# Patient Record
Sex: FEMALE | Race: WHITE | URBAN - METROPOLITAN AREA
[De-identification: names, ages, dates, MRNs, and addresses within clinical notes are randomized per-mention and may not be internally consistent; named-entity substitution may affect disease eponyms.]

---

## 2019-02-06 ENCOUNTER — OFFICE VISIT (OUTPATIENT)
Dept: FAMILY MEDICINE CLINIC | Age: 24
End: 2019-02-06
Payer: COMMERCIAL

## 2019-02-06 VITALS
HEIGHT: 65 IN | BODY MASS INDEX: 20.79 KG/M2 | TEMPERATURE: 99.1 F | OXYGEN SATURATION: 98 % | RESPIRATION RATE: 16 BRPM | HEART RATE: 72 BPM | SYSTOLIC BLOOD PRESSURE: 107 MMHG | WEIGHT: 124.8 LBS | DIASTOLIC BLOOD PRESSURE: 66 MMHG

## 2019-02-06 DIAGNOSIS — Z00.00 ANNUAL PHYSICAL EXAM: Primary | ICD-10-CM

## 2019-02-06 PROCEDURE — 99385 PREV VISIT NEW AGE 18-39: CPT | Performed by: FAMILY MEDICINE

## 2019-02-06 ASSESSMENT — PATIENT HEALTH QUESTIONNAIRE - PHQ9
SUM OF ALL RESPONSES TO PHQ QUESTIONS 1-9: 0
SUM OF ALL RESPONSES TO PHQ QUESTIONS 1-9: 0
SUM OF ALL RESPONSES TO PHQ9 QUESTIONS 1 & 2: 0
2. FEELING DOWN, DEPRESSED OR HOPELESS: 0
1. LITTLE INTEREST OR PLEASURE IN DOING THINGS: 0

## 2021-03-02 ENCOUNTER — HOSPITAL ENCOUNTER (OUTPATIENT)
Dept: PHYSICAL THERAPY | Age: 26
Discharge: HOME | End: 2021-03-02
Payer: COMMERCIAL

## 2021-03-02 PROCEDURE — 97140 MANUAL THERAPY 1/> REGIONS: CPT

## 2021-03-02 PROCEDURE — 97110 THERAPEUTIC EXERCISES: CPT

## 2021-03-02 PROCEDURE — 97161 PT EVAL LOW COMPLEX 20 MIN: CPT

## 2021-03-02 NOTE — PROGRESS NOTES
PELVIC FLOOR DAILY TREATMENT NOTE  1-    Patient Name: Camden Lo  Date:3/2/2021  : 1995  [x]  Patient  Verified  Payor: Kirsten Hyman / Plan: [id number] [place] / Product Type: Managed Care Medicaid /    In time:8:00  Out time:9:00  Total Treatment Time (min): 60    (for MC and BCBS only)  Total Timed Codes (min): na  1:1 Treatment Time (min): na     Visit #: 1 of 8    Treatment Area: Encounter for routine postpartum follow-up [Z39.2]    SUBJECTIVE  Pain Level (0-10 scale): 0  Any medication changes, allergies to medications, adverse drug reactions, diagnosis change, or new procedure performed?: [x] No    [] Yes (see summary sheet for update)  Subjective functional status/changes:   [] No changes reported  See POC     OBJECTIVE  Modality rationale: Increase trissue extensibility, decrease pain in order to Improve ability to perform ADLs and to perform cardio exercise such as running.    Min Type Additional Details   na [x] Biofeedback x na minutes    na surface    [] Estim: []Att   []Unatt        []TENS instruct                  []IFC  []Premod   []NMES                     []Other:  []w/US   []w/ice   []w/heat  Position:  Location:    []  Traction: [] Cervical       []Lumbar                       [] Prone          []Supine                       []Intermittent   []Continuous Lbs:  [] before manual  [] after manual    []  Ultrasound: []Continuous   [] Pulsed                           []1MHz   []3MHz Location:  W/cm2:    []  Iontophoresis with dexamethasone         Location: [] Take home patch   [] In clinic   10 [x]  Ice     []  heat  []  Ice massage Position: supine  Location: left inner thigh    []  Vasopneumatic Device Pressure:       [] lo [] med [] hi   Temperature: [] lo [] med [] hi   [x] Skin assessment post-treatment:  [x]intact []redness- no adverse reaction       []redness - adverse reaction:     na min Therapeutic Exercise:  [x] See flow sheet :  []  Pelvic floor strengthening []  Pelvic floor downtraining  []  Quality pelvic floor contractions      []  Relaxation techniques  []  Urge suppression exercises  [x]  Other: Core strengthening   Rationale: Increase core strength in order to Improve ability to perform ADLs and improve patient's ability to exercise safely with diastasis recti. .     min Therapeutic Activity:  []  See flow sheet :   Rationale: na in order to na. 8 min Manual Therapy: MET right anterior ileal rotation and in flare. Rationale: Improve lumbosacral and coccygeal mobility in order to Improve ability to perform ADLs. The manual therapy interventions were performed at a separate and distinct time from the therapeutic activities interventions. 15 min Patient Education: [x] Review HEP/POC/Goals    [] Educated Pt in pelvic floor and SI anatomy, function/dysfunction. Performing bed mobility safely with diastasis recti. Discussed that patient may benefit from biofeedback to the pelvic floor. Patient to consider. [] positioning   [] body mechanics   [] transfers   [] heat/ice application        Other Objective/Functional Measures:    [x]baseline resting tone: na   [x]slow twitch mms na in na. [x]fast twitch mms na . Pain Level (0-10 scale) post treatment: 0    ASSESSMENT/Changes in Function: Justification for Eval Code Complexity:  Patient History : See POC  Examination see exam   Clinical Presentation: evolving  Clinical Decision Making : FOTO : 8 /100        Patient will continue to benefit from skilled PT services to modify and progress therapeutic interventions, address functional mobility deficits, address ROM deficits, address strength deficits, analyze and address soft tissue restrictions, analyze and cue movement patterns, analyze and modify body mechanics/ergonomics, assess and modify postural abnormalities and instruct in home and community integration to attain remaining goals.      [x]  See Plan of Care  []  See progress note/recertification  []  See Discharge Summary         Progress towards goals / Updated goals:  Initial evaluation completed with home exercise program and education initiated.       PLAN  [x]  Upgrade activities as tolerated     []  Continue plan of care  []  Update interventions per flow sheet       []  Discharge due to:_  []  Other:_      Ildefonso Hebert, PT 3/2/2021  10: 00 AM      Future Appointments   Date Time Provider Mick Lawson   3/12/2021 10:45 AM Emigdio Hernandez, PT [facility]

## 2021-03-02 NOTE — PROGRESS NOTES
[address] [facility] PHYSICAL THERAPY  [x]  At [facility] [address] [facility]. [address] [facility], [address] [facility] [address] - Phone: [phone] Fax: [phone] / [address] [facility]  Patient Name: Kenyetta [place] : 1995   Medical   Diagnosis: Encounter for routine postpartum follow-up [Z39.2] Treatment Diagnosis: Encounter for routine postpartum follow-up [Z39.2]   Onset Date: 2020     Referral Source: Jose L Tomlin, Shan [facility]): 3/2/2021   Prior Hospitalization: See medical history Provider #: [id number]   Prior Level of Function: Chronic upper and lower back pain since age 16 with insidious onset. Comorbidities: G1, P1   Medications: Verified on Patient Summary List   The Plan of Care and following information is based on the information from the initial evaluation.   ==================================================================================  Assessment / key information:  Patient is a 26 y.o. yo female who presents to [facility] with diagnosis of Encounter for routine postpartum follow-up [Z39.2]. Patient reports left inner thigh pain with running and any cardio exercises which began 2020 when she was 6 months pregnant. .  Patient is G1, P1 with vaginal delivery. Patient denies urinary symptoms or dyspareunia. BMs are every day to every other day  without straining. She has chronic LBP since age 16 for which she is currently receiving weekly chiropractic care. Patient reports that she has never had a back xray. She also has a 1 finger width diastasis recti. Patient is presently breastfeeding. Upon objective evaluation, patient demonstrates right SI hypomobility with anterior ileal rotation and in flare, postural deviation of increased lumbar lordosis, impaired trunk AROM in extension, decreased hamstring flexibility bilaterally and impaired hip strength bilaterally in adduction. There is a grade 1 diastasis recti present. Patient scored 8 on FOTO/PFDI pain indicating decreased quality of life. Patient can benefit from PT for manual therapy, therapeutic exercises and modalities to decrease muscular tenderness and pain, Increase core strength and LE flexibility to improve quality of life.   ==================================================================================  Eval Complexity: History: MEDIUM  Complexity : 1-2 comorbidities / personal factors will impact the outcome/ POC Exam:HIGH Complexity : 4+ Standardized tests and measures addressing body structure, function, activity limitation and / or participation in recreation  Presentation: MEDIUM Complexity : Evolving with changing characteristics  Clinical Decision Making:LOW Complexity : FOTO score of 75-100Overall Complexity:LOW   Problem List: Decreased strength, ROM and flexibility, Pain with running, Diastasis recti   Treatment Plan may include any combination of the following: Therapeutic exercise, Neuromuscular re-education, Manual therapy, Physical agent/modality, Patient education and Other, Biofeedback for the pelvic floor. Patient / Family readiness to learn indicated by: asking questions, trying to perform skills and interest  Persons(s) to be included in education: patient (P)  Barriers to Learning/Limitations: None  Measures taken:    Patient Goal (s): \"Exercises to heal diastasis recti, help managing leg pain\"   Patient self reported health status: excellent  Rehabilitation Potential: good  Short Term Goals: To be accomplished in 4 weeks:     1) Patient performing daily HEP. 2) Patient will report 25% improvement in thigh pain with running. 3) Patient educated in exercising and performing ADLs safely with diastasis recti. 4) Increase bilateral hip adduction to 4/5 to increase ability to perform cardio exercise. Long Term Goals:  To be accomplished in 8 weeks:   1) Patient independent in HEP and able to demonstrate correct technique for floor to chest lifting. 2) Patient will report 50% improvement in pain with running. 3) Decrease score on FOTO/PFDI pain to 3 to indicate improved quality of life. 4)Increase bilateral hip adduction to 5/5 to increase ability to perform cardio exercise. Frequency / Duration:   Patient to be seen  1  times per week for 8  weeks:  Patient / Caregiver education and instruction: Pain Management, Exercises and Other Body mechanics  G-Codes (GP): yanique  Therapist Signature: Anuel Medina PT Date: 5/7/4641   Certification Period: na Time: 8:00 AM   ==================================================================================  I certify that the above Physical Therapy Services are being furnished while the patient is under my care. I agree with the treatment plan and certify that this therapy is necessary. Physician Signature:        Date:       Time:     Please sign and return to [facility], [facility]. or you may fax the signed copy to [phone]. Thank you.      JONAH De La Garza*

## 2021-03-12 ENCOUNTER — HOSPITAL ENCOUNTER (OUTPATIENT)
Dept: PHYSICAL THERAPY | Age: 26
Discharge: HOME | End: 2021-03-12
Payer: COMMERCIAL

## 2021-03-12 PROCEDURE — 97110 THERAPEUTIC EXERCISES: CPT

## 2021-03-12 PROCEDURE — 97530 THERAPEUTIC ACTIVITIES: CPT

## 2021-03-12 NOTE — PROGRESS NOTES
PELVIC FLOOR DAILY TREATMENT NOTE  1-21    Patient Name: Marla Masterson  Date:3/12/2021  : 1995  [x]  Patient  Verified  Payor: Zaira Rain / Plan: [employer]  [employer] PT / Product Type: Commerical /    In time:10:46  Out time:11:37  Total Treatment Time (min): 51    (for MC and BCBS only)  Total Timed Codes (min): na  1:1 Treatment Time (min): na     Visit #: 2 of 8    Treatment Area: Encounter for routine postpartum follow-up [Z39.2]    SUBJECTIVE  Pain Level (0-10 scale): 0  Any medication changes, allergies to medications, adverse drug reactions, diagnosis change, or new procedure performed?: [x] No    [] Yes (see summary sheet for update)  Subjective functional status/changes:   [] No changes reported  Patient reports doing HEP 2x week. She is working out 4x week, climbing and HIT. Hasn't needed the chiropractor in 2 weeks. Has not had back pain in the last week. OBJECTIVE  Modality rationale: Decrease pain, prevent post exercise soreness in order to Improve ability to perform ADLs.    Min Type Additional Details   na [x] Biofeedback x na minutes    na surface    [] Estim: []Att   []Unatt        []TENS instruct                  []IFC  []Premod   []NMES                     []Other:  []w/US   []w/ice   []w/heat  Position:  Location:    []  Traction: [] Cervical       []Lumbar                       [] Prone          []Supine                       []Intermittent   []Continuous Lbs:  [] before manual  [] after manual    []  Ultrasound: []Continuous   [] Pulsed                           []1MHz   []3MHz Location:  W/cm2:    []  Iontophoresis with dexamethasone         Location: [] Take home patch   [] In clinic    []  Ice     []  heat  []  Ice massage Position:  Location:    []  Vasopneumatic Device Pressure:       [] lo [] med [] hi   Temperature: [] lo [] med [] hi   [x] Skin assessment post-treatment:  [x]intact []redness- no adverse reaction       []redness - adverse reaction:     40 min Therapeutic Exercise:  [x] See flow sheet :  [x]  Pelvic floor strengthening                []  Pelvic floor downtraining  []  Quality pelvic floor contractions      []  Relaxation techniques  []  Urge suppression exercises  [x]  Other: Core strengthening, stretching,exercises for diastasis recti   Rationale: Increase core strength in order to Improve ability to perform ADLs and improve ability to run and perform cardio exercise. s.    11 min Therapeutic Activity:  [x]  See flow sheet : Sitting posture in neutral spine, Body mechanics for floor to chest lifting and lifting baby   Rationale: Decrease risk of back injury with ADLs and improve safety of performing ADLs and exercises with diastasis recti. na min Manual Therapy:    Rationale: Improve lumbosacral and coccygeal mobility in order to Improve ability to perform ADLs. The manual therapy interventions were performed at a separate and distinct time from the therapeutic activities interventions. min Patient Education: [x] Review HEP    [] Progressed/Changed HEP based on: Add BKFO and PPT. Issued handouts for posture and body mechanics with ADLs  [] positioning   [] body mechanics   [] transfers   [] heat/ice application        Other Objective/Functional Measures:    [x]baseline resting tone: na   [x]slow twitch mms na in na. [x]fast twitch mms na . Pain Level (0-10 scale) post treatment: 0    ASSESSMENT/Changes in Function: Fair compliance with HEP. Advanced core strengthening, diastasis recti exercises and stretching with introduction to education on posture and body mechanics.     Patient will continue to benefit from skilled PT services to modify and progress therapeutic interventions, address functional mobility deficits, address ROM deficits, address strength deficits, analyze and address soft tissue restrictions, analyze and cue movement patterns, analyze and modify body mechanics/ergonomics, assess and modify postural abnormalities and instruct in home and community integration to attain remaining goals. []  See Plan of Care  []  See progress note/recertification  []  See Discharge Summary         Progress towards goals / Updated goals:                 1) Patient performing daily HEP. Progressing                 2) Patient will report 25% improvement in thigh pain with running. 3) Patient educated in exercising and performing ADLs safely with diastasis recti. 4) Increase bilateral hip adduction to 4/5 to increase ability to perform cardio exercise.     PLAN  [x]  Upgrade activities as tolerated     []  Continue plan of care  []  Update interventions per flow sheet       []  Discharge due to:_  []  Other:_      Julio Crockett, PT 3/12/2021  11:37 AM      Future Appointments   Date Time Provider Mick Lawson   4/1/2021 10:00 AM Linette Light, PT [facility]   4/21/2021 10:30 AM Linette Light, PT [facility]   4/28/2021 10:30 AM Linette Light, PT [facility]   5/4/2021 10:30 AM Linette Light, PT [facility]   5/11/2021 10:30 AM Linette Light, PT [facility]   5/18/2021 10:30 AM Linette Light, PT [facility]

## 2021-04-01 ENCOUNTER — APPOINTMENT (OUTPATIENT)
Dept: PHYSICAL THERAPY | Age: 26
End: 2021-04-01
Payer: COMMERCIAL

## 2021-04-16 ENCOUNTER — HOSPITAL ENCOUNTER (OUTPATIENT)
Dept: PHYSICAL THERAPY | Age: 26
Discharge: HOME | End: 2021-04-16
Payer: COMMERCIAL

## 2021-04-16 PROCEDURE — 97110 THERAPEUTIC EXERCISES: CPT

## 2021-04-16 NOTE — PROGRESS NOTES
PELVIC FLOOR DAILY TREATMENT NOTE  1-    Patient Name: Gage Thomason  Date:2021  : 1995  [x]  Patient  Verified  Payor: Chapin Oates / Plan: [address] [place] [address] Rd PT / Product Type: Commerical /    In time 2:27  Out time: 3:04  Total Treatment Time (min): 37    (for MC and BCBS only)  Total Timed Codes (min): 37  1:1 Treatment Time (min): 37     Visit #: 3 of 8    Treatment Area: Encounter for routine postpartum follow-up [Z39.2]    SUBJECTIVE  Pain Level (0-10 scale): 0  Any medication changes, allergies to medications, adverse drug reactions, diagnosis change, or new procedure performed?: [x] No    [] Yes (see summary sheet for update)  Subjective functional status/changes:   [] No changes reported  Patient reports that she has not been exercising consistently due to having family visiting. Left inner thigh pain 45% improved     OBJECTIVE  Modality rationale: Decrease pain, prevent post exercise soreness in order to Improve ability to perform ADLs.    Min Type Additional Details   na [x] Biofeedback x na minutes    na surface    [] Estim: []Att   []Unatt        []TENS instruct                  []IFC  []Premod   []NMES                     []Other:  []w/US   []w/ice   []w/heat  Position:  Location:    []  Traction: [] Cervical       []Lumbar                       [] Prone          []Supine                       []Intermittent   []Continuous Lbs:  [] before manual  [] after manual    []  Ultrasound: []Continuous   [] Pulsed                           []1MHz   []3MHz Location:  W/cm2:    []  Iontophoresis with dexamethasone         Location: [] Take home patch   [] In clinic    []  Ice     []  heat  []  Ice massage Position:  Location:    []  Vasopneumatic Device Pressure:       [] lo [] med [] hi   Temperature: [] lo [] med [] hi   [x] Skin assessment post-treatment:  [x]intact []redness- no adverse reaction       []redness  adverse reaction:     27 min Therapeutic Exercise:  [x] See flow sheet :  [x] Pelvic floor strengthening                []  Pelvic floor downtraining  []  Quality pelvic floor contractions      []  Relaxation techniques  []  Urge suppression exercises  [x]  Other: Core strengthening, stretching,exercises for diastasis recti   Rationale: Increase core strength in order to Improve ability to perform ADLs and improve ability to run and perform cardio exercise. s.    na min Therapeutic Activity:  [x]  See flow sheet :    Rationale: Decrease risk of back injury with ADLs and improve safety of performing ADLs and exercises with diastasis recti. na min Manual Therapy:    Rationale: Improve lumbosacral and coccygeal mobility in order to Improve ability to perform ADLs. The manual therapy interventions were performed at a separate and distinct time from the therapeutic activities interventions. 10 min Patient Education: [x] Review HEP    [] Progressed/Changed HEP based on: Discussed progress including functional improvements and impairments. Add single leg slides with TA. Advance to slow twitch 5 second holds for PF. Body mechanics for supine to sit with TA.  [] positioning   [] body mechanics   [] transfers   [] heat/ice application        Other Objective/Functional Measures:    [x]baseline resting tone: na   [x]slow twitch mms na in na. [x]fast twitch mms na . Pain Level (0-10 scale) post treatment: 0    ASSESSMENT/Changes in Function: See PN  Patient will continue to benefit from skilled PT services to modify and progress therapeutic interventions, address functional mobility deficits, address ROM deficits, address strength deficits, analyze and address soft tissue restrictions, analyze and cue movement patterns, analyze and modify body mechanics/ergonomics, assess and modify postural abnormalities and instruct in home and community integration to attain remaining goals.      []  See Plan of Care  [x]  See progress note/recertification  []  See Discharge Summary         Progress towards goals / Updated goals:                 See PN  PLAN  [x]  Upgrade activities as tolerated     []  Continue plan of care  []  Update interventions per flow sheet       []  Discharge due to:_  []  Other:_      Mari Vazquez, PT 4/16/2021  3:04 PM      Future Appointments   Date Time Provider Mick Lawson   4/21/2021 10:30 AM Carolamary Hidalgo, PT [facility]   4/28/2021 10:30 AM Carolamary Hidalgo, PT [facility]   5/4/2021 10:30 AM Carola Rocky, PT [facility]   5/11/2021 10:30 AM Carola Rocky, PT [facility]   5/18/2021 10:30 AM Carolamary Hidalgo, PT [facility]

## 2021-04-16 NOTE — PROGRESS NOTES
[address] PHYSICAL THERAPY  [x]  At [facility] [address] [place]. [address] [facility], [address] [facility] [address] - Phone: [phone] Fax: [phone]    PROGRESS NOTE  Patient Name: Fide Enamorado : 1995   Treatment/Medical Diagnosis: Encounter for routine postpartum follow-up [Z39.2]   Referral Source: Harrison Martin*     Date of Initial Visit: 3/2/2021 Attended Visits: 3 Missed Visits: 1   SUMMARY OF TREATMENT  PT has consisted of pelvic floor/core strengthening  education on posture, exercising safely with diastasis recti and body mechanics, stretching and home exercise program.   CURRENT STATUS  Patient has made good progress in PT with short term goals partially met. Functional progress Includes patient reporting 45% improvement in thigh pain with ADLs. She has not resumed running. Goal/Measure of Progress Goal Met? 1. Patient performing daily HEP. Status at last Eval: na Current Status: Pt inconsistent progressing   2. Patient will report 25% improvement in thigh pain with running. Status at last Eval: na Current Status: 45% progressing   3. Patient educated in exercising and performing ADLs safely with diastasis recti. 4) Increase bilateral hip adduction to 4/5 to increase ability to perform cardio exercise   Status at last Eval: Na  3-3+/5 Current Status: Patient educated and performing   5/5 Yes  yes   New Goals to be achieved in __4__  weeks:                   1) Patient independent in HEP and able to demonstrate correct technique for floor to chest lifting. 2) Patient will report 50% improvement in pain with running. 3) Decrease score on FOTO/PFDI pain to 3 to indicate improved quality of life. RECOMMENDATIONS  Continue pelvic floor PT 1x week for 4 weeks. If you have any questions/comments please contact us directly at [phone].    Thank you for allowing us to assist in the care of your patient. Therapist Signature: Miracle Esparza, HARDY Date: 4/16/2021     Time: 2:41 PM   NOTE TO PHYSICIAN:  PLEASE COMPLETE THE ORDERS BELOW AND FAX TO   [facility] at [facility], [facility].: [phone]. If you are unable to process this request in 24 hours please contact our office: [phone]    ___ I have read the above report and request that my patient continue as recommended.   ___ I have read the above report and request that my patient continue therapy with the following changes/special instructions:_________________________________________________________   ___ I have read the above report and request that my patient be discharged from therapy.      Physician Signature:        Date:       Time:      ALMA Ruiz

## 2021-04-21 ENCOUNTER — HOSPITAL ENCOUNTER (OUTPATIENT)
Dept: PHYSICAL THERAPY | Age: 26
Discharge: HOME | End: 2021-04-21
Payer: COMMERCIAL

## 2021-04-21 PROCEDURE — 97110 THERAPEUTIC EXERCISES: CPT

## 2021-04-21 PROCEDURE — 97140 MANUAL THERAPY 1/> REGIONS: CPT

## 2021-04-21 NOTE — PROGRESS NOTES
PELVIC FLOOR DAILY TREATMENT NOTE      Patient Name: Lenin Faustin  Date:2021  : 1995  [x]  Patient  Verified  Payor: BLUE CROSS / Plan: [address] [place] [address] / Product Type: PPO /    In time 10:34  Out time: 11:24  Total Treatment Time (min): 50    (for MC and BCBS only)  Total Timed Codes (min): 50  1:1 Treatment Time (min): 50     Visit #: 4 of 8    Treatment Area: Encounter for routine postpartum follow-up [Z39.2]    SUBJECTIVE  Pain Level (0-10 scale): 0  Any medication changes, allergies to medications, adverse drug reactions, diagnosis change, or new procedure performed?: [x] No    [] Yes (see summary sheet for update)  Subjective functional status/changes:   [] No changes reported  Patient reports that she tried walking briskly but she got the left inner thigh pain described as tightness. OBJECTIVE  Modality rationale: Decrease pain, prevent post exercise soreness in order to Improve ability to perform ADLs.    Min Type Additional Details   na [x] Biofeedback x na minutes    na surface    [] Estim: []Att   []Unatt        []TENS instruct                  []IFC  []Premod   []NMES                     []Other:  []w/US   []w/ice   []w/heat  Position:  Location:    []  Traction: [] Cervical       []Lumbar                       [] Prone          []Supine                       []Intermittent   []Continuous Lbs:  [] before manual  [] after manual    []  Ultrasound: []Continuous   [] Pulsed                           []1MHz   []3MHz Location:  W/cm2:    []  Iontophoresis with dexamethasone         Location: [] Take home patch   [] In clinic    []  Ice     []  heat  []  Ice massage Position:  Location:    []  Vasopneumatic Device Pressure:       [] lo [] med [] hi   Temperature: [] lo [] med [] hi   [x] Skin assessment post-treatment:  [x]intact []redness- no adverse reaction       []redness  adverse reaction:     40 min Therapeutic Exercise:  [x] See flow sheet :  [x]  Pelvic floor strengthening                []  Pelvic floor downtraining  []  Quality pelvic floor contractions      []  Relaxation techniques  []  Urge suppression exercises  [x]  Other: Core strengthening, stretching,exercises for diastasis recti   Rationale: Increase core strength in order to Improve ability to perform ADLs and improve ability to run and perform cardio exercise. s.    na min Therapeutic Activity:  [x]  See flow sheet :    Rationale: Decrease risk of back injury with ADLs and improve safety of performing ADLs and exercises with diastasis recti. 10 min Manual Therapy: Friction massage to left adductor longus tendon. Rationale: Improve lumbosacral and coccygeal mobility in order to Improve ability to perform ADLs. The manual therapy interventions were performed at a separate and distinct time from the therapeutic activities interventions. min Patient Education: [x] Review HEP    [] Progressed/Changed HEP based on:  Progress to single leg slides off surface with TA. Advance to slow twitch 7 second holds for PF. Progress to TA, PPT, hip add with ball, hip abd with TB to sitting. Add sidelie hip add and abd. [] positioning   [] body mechanics   [] transfers   [] heat/ice application        Other Objective/Functional Measures:    [x]baseline resting tone: na   [x]slow twitch mms na in na. [x]fast twitch mms na . Pain Level (0-10 scale) post treatment: 0    ASSESSMENT/Changes in Function:  Advanced to sitting core exercises and added friction massage to left adductor longus tendon.     Patient will continue to benefit from skilled PT services to modify and progress therapeutic interventions, address functional mobility deficits, address ROM deficits, address strength deficits, analyze and address soft tissue restrictions, analyze and cue movement patterns, analyze and modify body mechanics/ergonomics, assess and modify postural abnormalities and instruct in home and community integration to attain remaining goals.      []  See Plan of Care  []  See progress note/recertification  []  See Discharge Summary         Progress towards goals / Updated goals:                 1) Patient independent in HEP and able to demonstrate correct technique for floor to chest lifting.                   2) Patient will report 50% improvement in pain with running.                   3) Decrease score on FOTO/PFDI pain to 3 to indicate improved quality of life.       PLAN  [x]  Upgrade activities as tolerated     []  Continue plan of care  []  Update interventions per flow sheet       []  Discharge due to:_  []  Other:_      Zac Kumar, PT 4/21/2021  11:24  AM      Future Appointments   Date Time Provider Mick Lawson   4/28/2021 10:30 AM Alysha Paige, PT [facility]   5/4/2021 10:30 AM Alysha Paige, PT [facility]   5/11/2021 10:30 AM Alysha Paige, PT [facility]   5/18/2021 10:30 AM Alysha Paige, PT [facility]

## 2021-04-28 ENCOUNTER — HOSPITAL ENCOUNTER (OUTPATIENT)
Dept: PHYSICAL THERAPY | Age: 26
Discharge: HOME | End: 2021-04-28
Payer: COMMERCIAL

## 2021-04-28 PROCEDURE — 97110 THERAPEUTIC EXERCISES: CPT

## 2021-04-28 PROCEDURE — 97140 MANUAL THERAPY 1/> REGIONS: CPT

## 2021-04-28 NOTE — PROGRESS NOTES
PELVIC FLOOR DAILY TREATMENT NOTE      Patient Name: Gene Lovell  Date:2021  : 1995  [x]  Patient  Verified  Payor: BLUE CROSS / Plan: [address] [place] [address] / Product Type: PPO /    In time 10:36  Out time: 11:29   Total Treatment Time (min): 53    (for MC and BCBS only)  Total Timed Codes (min): 53  1:1 Treatment Time (min): 53     Visit #: 5 of 8    Treatment Area: Encounter for routine postpartum follow-up [Z39.2]    SUBJECTIVE  Pain Level (0-10 scale): 5/10 left knee with slowly bending knee, when first rises in AM and stairs. Any medication changes, allergies to medications, adverse drug reactions, diagnosis change, or new procedure performed?: [x] No    [] Yes (see summary sheet for update)  Subjective functional status/changes:   [] No changes reported  Patient reports left knee pain which began the afternoon after having PT 2021. Did not hurt immediately after PT. Did not notice pain during PT. Did bike ride 2 miles 3x weekly week of 2021. Bought knee brace at [employer] which helps. Did ride bike 1x this past week for 2 miles which hurt minimally. Doesn't hurt with walking. Inner thigh is hurting less. Able to walk 3 miles and do a little jogging without inner thigh pain. Was able to rock climb 45 minutes with minimal discomfort in knee. OBJECTIVE  Modality rationale: Decrease pain, prevent post exercise soreness in order to Improve ability to perform ADLs.    Min Type Additional Details   na [x] Biofeedback x na minutes    na surface    [] Estim: []Att   []Unatt        []TENS instruct                  []IFC  []Premod   []NMES                     []Other:  []w/US   []w/ice   []w/heat  Position:  Location:    []  Traction: [] Cervical       []Lumbar                       [] Prone          []Supine                       []Intermittent   []Continuous Lbs:  [] before manual  [] after manual    []  Ultrasound: []Continuous   [] Pulsed                           []1MHz []3MHz Location:  W/cm2:    []  Iontophoresis with dexamethasone         Location: [] Take home patch   [] In clinic    []  Ice     []  heat  []  Ice massage Position:  Location:    []  Vasopneumatic Device Pressure:       [] lo [] med [] hi   Temperature: [] lo [] med [] hi   [x] Skin assessment post-treatment:  [x]intact []redness- no adverse reaction       []redness  adverse reaction:     43 min Therapeutic Exercise:  [x] See flow sheet :  [x]  Pelvic floor strengthening                []  Pelvic floor downtraining  []  Quality pelvic floor contractions      []  Relaxation techniques  []  Urge suppression exercises  [x]  Other: Core strengthening, stretching,exercises for diastasis recti   Rationale: Increase core strength in order to Improve ability to perform ADLs and improve ability to run and perform cardio exercise. s.    na min Therapeutic Activity:  [x]  See flow sheet :    Rationale: Decrease risk of back injury with ADLs and improve safety of performing ADLs and exercises with diastasis recti. 10 min Manual Therapy: Friction massage to left adductor longus tendon and distal gracilistendon. Rationale: Improve lumbosacral and coccygeal mobility in order to Improve ability to perform ADLs. The manual therapy interventions were performed at a separate and distinct time from the therapeutic activities interventions. min Patient Education: [x] Review HEP    [] Progressed/Changed HEP based on:  Progress to double leg slides on surface with TA. Advance to slow twitch 10 second holds for PF. Progress to TA, PPT in standing and peach TB for side clam.  Hold sidelie hip add and abd. [] positioning   [] body mechanics   [] transfers   [] heat/ice application        Other Objective/Functional Measures:    []baseline resting tone: na   []slow twitch mms na in na. [x]fast twitch mms na .     Pain Level (0-10 scale) post treatment: 0    ASSESSMENT/Changes in Function:  Increased pain medial distal thigh and knee reproduced by left side lie hip add. Tenderness over left gracilis muscle near insertion. Patient will continue to benefit from skilled PT services to modify and progress therapeutic interventions, address functional mobility deficits, address ROM deficits, address strength deficits, analyze and address soft tissue restrictions, analyze and cue movement patterns, analyze and modify body mechanics/ergonomics, assess and modify postural abnormalities and instruct in home and community integration to attain remaining goals.      []  See Plan of Care  []  See progress note/recertification  []  See Discharge Summary         Progress towards goals / Updated goals:                 1) Patient independent in HEP and able to demonstrate correct technique for floor to chest lifting.                   2) Patient will report 50% improvement in pain with running.   Progressing                 3) Decrease score on FOTO/PFDI pain to 3 to indicate improved quality of life.       PLAN  [x]  Upgrade activities as tolerated     []  Continue plan of care  []  Update interventions per flow sheet       []  Discharge due to:_  []  Other:_      Marianna Gipson, PT 4/28/2021  11:29  AM      Future Appointments   Date Time Provider Mick Lawson   5/4/2021 10:30 AM Riley Cee,  [facility]   5/11/2021 10:30 AM Riley Cee,  [address] [facility]   5/18/2021 10:30 AM Riley Cee,  [address] [facility]

## 2021-05-04 ENCOUNTER — HOSPITAL ENCOUNTER (OUTPATIENT)
Dept: PHYSICAL THERAPY | Age: 26
Discharge: HOME | End: 2021-05-04
Payer: COMMERCIAL

## 2021-05-04 PROCEDURE — 97110 THERAPEUTIC EXERCISES: CPT

## 2021-05-04 PROCEDURE — 97140 MANUAL THERAPY 1/> REGIONS: CPT

## 2021-05-04 NOTE — PROGRESS NOTES
PELVIC FLOOR DAILY TREATMENT NOTE  1-    Patient Name: Tobias Collet  Date:2021  : 1995  [x]  Patient  Verified  Payor: Norma Rubi / Plan: [address] [place] [address] / Product Type: PPO /    In time 10:40  Out time: 11:25   Total Treatment Time (min): 45    (for MC and BCBS only)  Total Timed Codes (min): 45  1:1 Treatment Time (min): 45     Visit #: 6 of 8    Treatment Area: Encounter for routine postpartum follow-up [Z39.2]    SUBJECTIVE  Pain Level (0-10 scale): 2/10 left knee   Any medication changes, allergies to medications, adverse drug reactions, diagnosis change, or new procedure performed?: [x] No    [] Yes (see summary sheet for update)  Subjective functional status/changes:   [] No changes reported  Patient reports left knee pain is much improved. Walking 3 miles daily and able to do a little jogging without pain. OBJECTIVE  Modality rationale: Decrease pain, prevent post exercise soreness in order to Improve ability to perform ADLs.    Min Type Additional Details   na [x] Biofeedback x na minutes    na surface    [] Estim: []Att   []Unatt        []TENS instruct                  []IFC  []Premod   []NMES                     []Other:  []w/US   []w/ice   []w/heat  Position:  Location:    []  Traction: [] Cervical       []Lumbar                       [] Prone          []Supine                       []Intermittent   []Continuous Lbs:  [] before manual  [] after manual    []  Ultrasound: []Continuous   [] Pulsed                           []1MHz   []3MHz Location:  W/cm2:    []  Iontophoresis with dexamethasone         Location: [] Take home patch   [] In clinic    []  Ice     []  heat  []  Ice massage Position:  Location:    []  Vasopneumatic Device Pressure:       [] lo [] med [] hi   Temperature: [] lo [] med [] hi   [x] Skin assessment post-treatment:  [x]intact []redness- no adverse reaction       []redness  adverse reaction:     30 min Therapeutic Exercise:  [x] See flow sheet :  [x] Pelvic floor strengthening                []  Pelvic floor downtraining  []  Quality pelvic floor contractions      []  Relaxation techniques  []  Urge suppression exercises  [x]  Other: Core strengthening, stretching,exercises for diastasis recti   Rationale: Increase core strength in order to Improve ability to perform ADLs and improve ability to run and perform cardio exercise. s.    na min Therapeutic Activity:  [x]  See flow sheet :    Rationale: Decrease risk of back injury with ADLs and improve safety of performing ADLs and exercises with diastasis recti. 15 min Manual Therapy: Friction massage to left adductor longus tendon and distal gracilis tendon. Rationale: Improve lumbosacral and coccygeal mobility in order to Improve ability to perform ADLs. The manual therapy interventions were performed at a separate and distinct time from the therapeutic activities interventions. min Patient Education: [x] Review HEP    [] Progressed/Changed HEP based on:  Progress to double leg slides to the chest with TA. Advance to slow twitch 10 second holds for PF. Restart sidelie hip add and abd. [] positioning   [] body mechanics   [] transfers   [] heat/ice application        Other Objective/Functional Measures:    []baseline resting tone: na   []slow twitch mms na in na. [x]fast twitch mms na . Pain Level (0-10 scale) post treatment: 0    ASSESSMENT/Changes in Function:  Patient able to perform left sidelie adduction without pain. Patient will continue to benefit from skilled PT services to modify and progress therapeutic interventions, address functional mobility deficits, address ROM deficits, address strength deficits, analyze and address soft tissue restrictions, analyze and cue movement patterns, analyze and modify body mechanics/ergonomics, assess and modify postural abnormalities and instruct in home and community integration to attain remaining goals.      []  See Plan of Care  []  See progress note/recertification  []  See Discharge Summary         Progress towards goals / Updated goals:                 1) Patient independent in HEP and able to demonstrate correct technique for floor to chest lifting.                   2) Patient will report 50% improvement in pain with running.   Progressing                 3) Decrease score on FOTO/PFDI pain to 3 to indicate improved quality of life.       PLAN  [x]  Upgrade activities as tolerated     []  Continue plan of care  []  Update interventions per flow sheet       []  Discharge due to:_  []  Other:_      Marianna Gipson, HARDY 5/4/2021  11:25  AM      Future Appointments   Date Time Provider Mick Lawson   5/11/2021 10:30 AM Riley Cee, PT [facility]   5/18/2021 10:30 AM [facility] [id number]

## 2021-05-11 ENCOUNTER — APPOINTMENT (OUTPATIENT)
Dept: PHYSICAL THERAPY | Age: 26
End: 2021-05-11
Payer: COMMERCIAL

## 2021-05-18 NOTE — PROGRESS NOTES
[facility] PHYSICAL THERAPY  [x]  At [facility] [place] [place] [address] [address] - Phone: [phone] Fax: [phone]    PROGRESS NOTE  Patient Name: Modesto Mcleod : 1995   Treatment/Medical Diagnosis: Encounter for routine postpartum follow-up [Z39.2]   Referral Source: Rip Merritt*     Date of Initial Visit: 3/2/2021 Attended Visits: 6 Missed Visits: 1   SUMMARY OF TREATMENT  PT has consisted of pelvic floor/core strengthening  education on posture, exercising safely with diastasis recti and body mechanics, stretching, ice and home exercise program.   CURRENT STATUS  Patient is making good progress in PT towards achieving long term goals. Functional progress Includes patient reporting that she is able to walk 3 miles daily and do a little jogging without pain. She continues to have muscular tenderness within the left hip adductors. She can benefit from continuing PT to decrease muscle tenderness, increase strength and progress towards return to running. Goal/Measure of Progress Goal Met? 1. Patient independent in HEP and able to demonstrate correct technique for floor to chest lifting   Status at last Eval: progressing Current Status: progressing progressing   2. Patient will report 50% improvement in thigh pain with running. Status at last Eval: 45% improvement in thigh pain with ADLs Current Status: progressing progressing   3. Decrease score on FOTO/PFDI pain to 3 to indicate improved quality of life.     Status at last Eval: 8 Current Status: ongoing ongoing   New Goals to be achieved in __4__  weeks:                   1) Patient independent in HEP and able to demonstrate correct technique for floor to chest lifting.                   2) Patient will report 50% improvement in thigh pain with running.                   3) Decrease score on FOTO/PFDI pain to 3 to indicate improved quality of life.       RECOMMENDATIONS  Continue pelvic floor PT 1x week for 4 weeks. If you have any questions/comments please contact us directly at [phone]. Thank you for allowing us to assist in the care of your patient. Therapist Signature: Kadi Johns, HARDY Date: 5/18/2021     Time: 12:32 PM   NOTE TO PHYSICIAN:  PLEASE COMPLETE THE ORDERS BELOW AND FAX TO   [facility] at [facility] [place].: [phone]. If you are unable to process this request in 24 hours please contact our office: [phone]    ___ I have read the above report and request that my patient continue as recommended.   ___ I have read the above report and request that my patient continue therapy with the following changes/special instructions:_________________________________________________________   ___ I have read the above report and request that my patient be discharged from therapy.      Physician Signature:        Date:       Time:      JONAH Pittman*

## 2021-06-02 ENCOUNTER — HOSPITAL ENCOUNTER (OUTPATIENT)
Dept: PHYSICAL THERAPY | Age: 26
Discharge: HOME | End: 2021-06-02
Payer: COMMERCIAL

## 2021-06-02 PROCEDURE — 97140 MANUAL THERAPY 1/> REGIONS: CPT

## 2021-06-02 PROCEDURE — 97110 THERAPEUTIC EXERCISES: CPT

## 2021-06-02 PROCEDURE — 97530 THERAPEUTIC ACTIVITIES: CPT

## 2021-06-02 NOTE — PROGRESS NOTES
PELVIC FLOOR DAILY TREATMENT NOTE  1-21    Patient Name: Osvaldo Esparza  Date:2021  : 1995  [x]  Patient  Verified  Payor: Robert Bhatti / Plan: [address] [place] [address] / Product Type: PPO /    In time 2:55  Out time: 3:37  Total Treatment Time (min): 42  (for MC and BCBS only)  Total Timed Codes (min): 42  1:1 Treatment Time (min): 42     Visit #: 7of 8    Treatment Area: Encounter for routine postpartum follow-up [Z39.2]    SUBJECTIVE  Pain Level (0-10 scale): 0/10 left knee, 4-5/10 back   Any medication changes, allergies to medications, adverse drug reactions, diagnosis change, or new procedure performed?: [x] No    [] Yes (see summary sheet for update)  Subjective functional status/changes:   [] No changes reported  Patient reports that she has been running and hasn't had any knee or thigh pain. Did hurt her low back when she lifted her toddler. OBJECTIVE  Modality rationale: Decrease pain, prevent post exercise soreness in order to Improve ability to perform ADLs.    Min Type Additional Details   na [x] Biofeedback x na minutes    na surface    [] Estim: []Att   []Unatt        []TENS instruct                  []IFC  []Premod   []NMES                     []Other:  []w/US   []w/ice   []w/heat  Position:  Location:    []  Traction: [] Cervical       []Lumbar                       [] Prone          []Supine                       []Intermittent   []Continuous Lbs:  [] before manual  [] after manual    []  Ultrasound: []Continuous   [] Pulsed                           []1MHz   []3MHz Location:  W/cm2:    []  Iontophoresis with dexamethasone         Location: [] Take home patch   [] In clinic    []  Ice     []  heat  []  Ice massage Position:  Location:    []  Vasopneumatic Device Pressure:       [] lo [] med [] hi   Temperature: [] lo [] med [] hi   [x] Skin assessment post-treatment:  [x]intact []redness- no adverse reaction       []redness  adverse reaction:     22 min Therapeutic Exercise:  [x] See flow sheet :  [x]  Pelvic floor strengthening                []  Pelvic floor downtraining  []  Quality pelvic floor contractions      []  Relaxation techniques  []  Urge suppression exercises  [x]  Other: Core strengthening, stretching,exercises for diastasis recti   Rationale: Increase core strength in order to Improve ability to perform ADLs and improve ability to run and perform cardio exercise. s.    10 min Therapeutic Activity:  [x]  See flow sheet : Body mechanics for floor to chest lifting. Balwinder TA prior to lifting and with daily ADLs. Rationale: Decrease risk of back injury with ADLs and improve safety of performing ADLs and exercises with diastasis recti. 10 min Manual Therapy:  Corrected right anterior ileal rotation with MET. Educated patient in self correction. Rationale: Improve lumbosacral and coccygeal mobility in order to Improve ability to perform ADLs. The manual therapy interventions were performed at a separate and distinct time from the therapeutic activities interventions. min Patient Education: [] Review HEP    [x] Progressed/Changed HEP based on:  Add quad UE/LE raises. Progress to double knee slides to chest.  [] positioning   [] body mechanics   [] transfers   [] heat/ice application        Other Objective/Functional Measures:    []baseline resting tone: na   []slow twitch mms na in na. [x]fast twitch mms na . Pain Level (0-10 scale) post treatment: 0    ASSESSMENT/Changes in Function:  Patient able to perform double knee slides off surface to chest without abdominal bulging. Advanced core exercises and added body mechanics for lifting. Patient demonstrated SI hypomobility on the right today. Instructed in self correction. .  Patient reports resolution of thigh pain with running.     Patient will continue to benefit from skilled PT services to modify and progress therapeutic interventions, address functional mobility deficits, address ROM deficits, address strength deficits, analyze and address soft tissue restrictions, analyze and cue movement patterns, analyze and modify body mechanics/ergonomics, assess and modify postural abnormalities and instruct in home and community integration to attain remaining goals.      []  See Plan of Care  []  See progress note/recertification  []  See Discharge Summary         Progress towards goals / Updated goals:                 1) Patient independent in HEP and able to demonstrate correct technique for floor to chest lifting.  Progressing                 2) Patient will report 50% improvement in pain with running.   Met                 3) Decrease score on FOTO/PFDI pain to 3 to indicate improved quality of life.       PLAN  [x]  Upgrade activities as tolerated     []  Continue plan of care  []  Update interventions per flow sheet       []  Discharge due to:_  []  Other:_      Tiana Messer, PT 6/2/2021  3:37 PM      Future Appointments   Date Time Provider Mick Lawson   6/25/2021  7:30 AM Ju Olvera, PT [facility] [address]

## 2021-06-10 ENCOUNTER — HOSPITAL ENCOUNTER (OUTPATIENT)
Dept: LAB | Age: 26
Discharge: HOME | End: 2021-06-10
Payer: COMMERCIAL

## 2021-06-10 PROCEDURE — 88175 CYTOPATH C/V AUTO FLUID REDO: CPT

## 2021-06-23 LAB
CYTOLOGIST CVX/VAG CYTO: NORMAL
CYTOLOGY CVX/VAG DOC THIN PREP: NORMAL
HPV REFLEX NOTE, HPVL19: NORMAL
Lab: NORMAL
PATH REPORT.FINAL DX SPEC: NORMAL
PATHOLOGIST CVX/VAG CYTO: NORMAL
PATHOLOGIST PROVIDED ICD10: NORMAL
RECOM F/U CVX/VAG CYTO: NORMAL
STAT OF ADQ CVX/VAG CYTO-IMP: NORMAL

## 2021-06-25 ENCOUNTER — HOSPITAL ENCOUNTER (OUTPATIENT)
Dept: PHYSICAL THERAPY | Age: 26
Discharge: HOME | End: 2021-06-25
Payer: COMMERCIAL

## 2021-06-25 PROCEDURE — 97110 THERAPEUTIC EXERCISES: CPT

## 2021-06-25 NOTE — PROGRESS NOTES
PELVIC FLOOR DAILY TREATMENT NOTE      Patient Name: Osvaldo Esparza  Date:2021  : 1995  [x]  Patient  Verified  Payor: Robert Bhatti / Plan: [address] [place] [address] / Product Type: PPO /    In time 7:34  Out time: 8:19  Total Treatment Time (min): 45  (for [employer] and [employer] only)  Total Timed Codes (min): 45  1:1 Treatment Time (min): 45     Visit #: 8of 8    Treatment Area: Encounter for routine postpartum follow-up [Z39.2]    SUBJECTIVE  Pain Level (0-10 scale): 0  Any medication changes, allergies to medications, adverse drug reactions, diagnosis change, or new procedure performed?: [x] No    [] Yes (see summary sheet for update)  Subjective functional status/changes:   [] No changes reported  See D/C note  OBJECTIVE  Modality rationale: Decrease pain, prevent post exercise soreness in order to Improve ability to perform ADLs.    Min Type Additional Details   na [x] Biofeedback x na minutes    na surface    [] Estim: []Att   []Unatt        []TENS instruct                  []IFC  []Premod   []NMES                     []Other:  []w/US   []w/ice   []w/heat  Position:  Location:    []  Traction: [] Cervical       []Lumbar                       [] Prone          []Supine                       []Intermittent   []Continuous Lbs:  [] before manual  [] after manual    []  Ultrasound: []Continuous   [] Pulsed                           []1MHz   []3MHz Location:  W/cm2:    []  Iontophoresis with dexamethasone         Location: [] Take home patch   [] In clinic    []  Ice     []  heat  []  Ice massage Position:  Location:    []  Vasopneumatic Device Pressure:       [] lo [] med [] hi   Temperature: [] lo [] med [] hi   [x] Skin assessment post-treatment:  [x]intact []redness- no adverse reaction       []redness  adverse reaction:     35 min Therapeutic Exercise:  [x] See flow sheet :  [x]  Pelvic floor strengthening                []  Pelvic floor downtraining  []  Quality pelvic floor contractions      [] Relaxation techniques  []  Urge suppression exercises  [x]  Other: Core strengthening, stretching,exercises for diastasis recti   Rationale: Increase core strength in order to Improve ability to perform ADLs and improve ability to run and perform cardio exercise. s.            10 min Patient Education: [] Review HEP    [x] Progressed/Changed HEP based on:  Discussion of progress including FOTO. Discharge Instructions. [] positioning   [] body mechanics   [] transfers   [] heat/ice application        Other Objective/Functional Measures:    []baseline resting tone: na   []slow twitch mms na in na. [x]fast twitch mms na . Pain Level (0-10 scale) post treatment: 0    ASSESSMENT/Changes in Function:  See D/C note    Patient will continue to benefit from skilled PT services to modify and progress therapeutic interventions, address functional mobility deficits, address ROM deficits, address strength deficits, analyze and address soft tissue restrictions, analyze and cue movement patterns, analyze and modify body mechanics/ergonomics, assess and modify postural abnormalities and instruct in home and community integration to attain remaining goals.      []  See Plan of Care  []  See progress note/recertification  [x]  See Discharge Summary         Progress towards goals / Updated goals:                 See D/C summary      PLAN  [x]  Upgrade activities as tolerated     []  Continue plan of care  []  Update interventions per flow sheet       []  Discharge due to:_  []  Other:_      Cleo Delgado, PT 6/25/2021  8:19 AM      Future Appointments   Date Time Provider Mick Lawson   6/25/2021  7:30 AM Julissa Whitten, PT [id number]

## 2021-06-25 NOTE — PROGRESS NOTES
[address] [facility] PHYSICAL THERAPY  [x]  At [facility] [address] [address] - Phone: [phone] Fax: [phone]    Discharge Summary  Patient Name: Yesy Painting : 1995   Treatment/Medical Diagnosis: Encounter for routine postpartum follow-up [Z39.2]   Referral Source: Neri Fleming*     Date of Initial Visit: 3/2/2021 Attended Visits: 8 Missed Visits: 1   SUMMARY OF TREATMENT  PT has consisted of pelvic floor/core strengthening  education on posture, exercising safely with diastasis recti and body mechanics, stretching, ice and home exercise program.   CURRENT STATUS  Patient has made good progress in PT with all long term goals met . Goal/Measure of Progress Goal Met? 1. Patient independent in HEP and able to demonstrate correct technique for floor to chest lifting   Status at last Eval: progressing Current Status: Pt independent and able to demo lifting correctly yes   2. Patient will report 50% improvement in thigh pain with running. Status at last Eval: 45% improvement in thigh pain with ADLs Current Status: 100% yes   3. Decrease score on FOTO/PFDI pain to 3 to indicate improved quality of life.     Status at last Eval: 8 Current Status: 0 yes          RECOMMENDATIONS  Discontinue PT secondary to all goals met. If you have any questions/comments please contact us directly at [phone]. Thank you for allowing us to assist in the care of your patient. Therapist Signature:  Cleo Delgado PT Date: 2021     Time: 8:19       Carmine Hodgkins, C*

## 2021-08-09 ENCOUNTER — HOSPITAL ENCOUNTER (OUTPATIENT)
Dept: LAB | Age: 26
Discharge: HOME | End: 2021-08-09
Payer: COMMERCIAL

## 2021-08-09 PROCEDURE — 88175 CYTOPATH C/V AUTO FLUID REDO: CPT

## 2021-08-11 LAB
CYTOLOGIST CVX/VAG CYTO: NORMAL
CYTOLOGY CVX/VAG DOC THIN PREP: NORMAL
HPV REFLEX NOTE, HPVL19: NORMAL
Lab: NORMAL
PATH REPORT.FINAL DX SPEC: NORMAL
STAT OF ADQ CVX/VAG CYTO-IMP: NORMAL